# Patient Record
Sex: FEMALE | NOT HISPANIC OR LATINO | Employment: FULL TIME | URBAN - METROPOLITAN AREA
[De-identification: names, ages, dates, MRNs, and addresses within clinical notes are randomized per-mention and may not be internally consistent; named-entity substitution may affect disease eponyms.]

---

## 2022-11-16 ENCOUNTER — HOSPITAL ENCOUNTER (EMERGENCY)
Facility: CLINIC | Age: 37
Discharge: HOME | End: 2022-11-17
Attending: EMERGENCY MEDICINE | Admitting: EMERGENCY MEDICINE
Payer: MEDICAID

## 2022-11-16 VITALS
OXYGEN SATURATION: 98 % | TEMPERATURE: 98.7 F | HEART RATE: 61 BPM | BODY MASS INDEX: 28.09 KG/M2 | SYSTOLIC BLOOD PRESSURE: 144 MMHG | RESPIRATION RATE: 16 BRPM | WEIGHT: 179 LBS | DIASTOLIC BLOOD PRESSURE: 84 MMHG | HEIGHT: 67 IN

## 2022-11-16 DIAGNOSIS — R10.33 PERIUMBILICAL ABDOMINAL PAIN: Primary | ICD-10-CM

## 2022-11-16 LAB
ALBUMIN SERPL-MCNC: 3.8 G/DL (ref 3.4–5)
ALP SERPL-CCNC: 46 U/L (ref 40–150)
ALT SERPL W P-5'-P-CCNC: 22 U/L (ref 0–50)
ANION GAP SERPL CALCULATED.3IONS-SCNC: 3 MMOL/L (ref 3–14)
AST SERPL W P-5'-P-CCNC: 13 U/L (ref 0–45)
BASOPHILS # BLD AUTO: 0 10E3/UL (ref 0–0.2)
BASOPHILS NFR BLD AUTO: 0 %
BILIRUB SERPL-MCNC: 0.5 MG/DL (ref 0.2–1.3)
BUN SERPL-MCNC: 9 MG/DL (ref 7–30)
CALCIUM SERPL-MCNC: 9.1 MG/DL (ref 8.5–10.1)
CHLORIDE BLD-SCNC: 107 MMOL/L (ref 94–109)
CO2 SERPL-SCNC: 28 MMOL/L (ref 20–32)
CREAT SERPL-MCNC: 0.78 MG/DL (ref 0.52–1.04)
EOSINOPHIL # BLD AUTO: 0 10E3/UL (ref 0–0.7)
EOSINOPHIL NFR BLD AUTO: 0 %
ERYTHROCYTE [DISTWIDTH] IN BLOOD BY AUTOMATED COUNT: 13.1 % (ref 10–15)
GFR SERPL CREATININE-BSD FRML MDRD: >90 ML/MIN/1.73M2
GLUCOSE BLD-MCNC: 112 MG/DL (ref 70–99)
HCG SERPL QL: NEGATIVE
HCT VFR BLD AUTO: 39.7 % (ref 35–47)
HGB BLD-MCNC: 12.9 G/DL (ref 11.7–15.7)
IMM GRANULOCYTES # BLD: 0 10E3/UL
IMM GRANULOCYTES NFR BLD: 0 %
LIPASE SERPL-CCNC: 110 U/L (ref 73–393)
LYMPHOCYTES # BLD AUTO: 2.4 10E3/UL (ref 0.8–5.3)
LYMPHOCYTES NFR BLD AUTO: 19 %
MCH RBC QN AUTO: 26.7 PG (ref 26.5–33)
MCHC RBC AUTO-ENTMCNC: 32.5 G/DL (ref 31.5–36.5)
MCV RBC AUTO: 82 FL (ref 78–100)
MONOCYTES # BLD AUTO: 0.9 10E3/UL (ref 0–1.3)
MONOCYTES NFR BLD AUTO: 7 %
NEUTROPHILS # BLD AUTO: 9.3 10E3/UL (ref 1.6–8.3)
NEUTROPHILS NFR BLD AUTO: 74 %
NRBC # BLD AUTO: 0 10E3/UL
NRBC BLD AUTO-RTO: 0 /100
PLATELET # BLD AUTO: 287 10E3/UL (ref 150–450)
POTASSIUM BLD-SCNC: 4 MMOL/L (ref 3.4–5.3)
PROT SERPL-MCNC: 7.3 G/DL (ref 6.8–8.8)
RBC # BLD AUTO: 4.83 10E6/UL (ref 3.8–5.2)
SODIUM SERPL-SCNC: 138 MMOL/L (ref 133–144)
WBC # BLD AUTO: 12.6 10E3/UL (ref 4–11)

## 2022-11-16 PROCEDURE — 85014 HEMATOCRIT: CPT | Performed by: EMERGENCY MEDICINE

## 2022-11-16 PROCEDURE — 85025 COMPLETE CBC W/AUTO DIFF WBC: CPT | Performed by: EMERGENCY MEDICINE

## 2022-11-16 PROCEDURE — 84703 CHORIONIC GONADOTROPIN ASSAY: CPT | Performed by: EMERGENCY MEDICINE

## 2022-11-16 PROCEDURE — 80053 COMPREHEN METABOLIC PANEL: CPT | Performed by: EMERGENCY MEDICINE

## 2022-11-16 PROCEDURE — 83690 ASSAY OF LIPASE: CPT | Performed by: EMERGENCY MEDICINE

## 2022-11-16 PROCEDURE — 99285 EMERGENCY DEPT VISIT HI MDM: CPT | Mod: 25

## 2022-11-16 PROCEDURE — 36415 COLL VENOUS BLD VENIPUNCTURE: CPT | Performed by: EMERGENCY MEDICINE

## 2022-11-16 ASSESSMENT — ENCOUNTER SYMPTOMS
HEMATURIA: 0
PALPITATIONS: 0
HEADACHES: 0
ABDOMINAL PAIN: 1
NECK PAIN: 0
DYSURIA: 0
RHINORRHEA: 0
ABDOMINAL DISTENTION: 0
SHORTNESS OF BREATH: 0
CHEST TIGHTNESS: 0
NAUSEA: 0
DIZZINESS: 0
VOMITING: 0
AGITATION: 0
BACK PAIN: 0
EYE PAIN: 0
COLOR CHANGE: 0
FEVER: 0

## 2022-11-16 NOTE — ED TRIAGE NOTES
Here with complaint of abdominal pain present since yesterday. Pain is pearl-umilical, still has appendix. Was kicked in stomach on Saturday during karate tournament. Pain 6-7/10. Also has noted some heart racing. HR in triage =70. Some nausea, no vomiting.      Triage Assessment     Row Name 11/16/22 1504       Triage Assessment (Adult)    Airway WDL WDL       Respiratory WDL    Respiratory WDL WDL       Skin Circulation/Temperature WDL    Skin Circulation/Temperature WDL WDL       Cardiac WDL    Cardiac WDL WDL       Peripheral/Neurovascular WDL    Peripheral Neurovascular WDL WDL       Cognitive/Neuro/Behavioral WDL    Cognitive/Neuro/Behavioral WDL WDL

## 2022-11-17 ENCOUNTER — APPOINTMENT (OUTPATIENT)
Dept: CT IMAGING | Facility: CLINIC | Age: 37
End: 2022-11-17
Attending: EMERGENCY MEDICINE
Payer: MEDICAID

## 2022-11-17 PROCEDURE — 74177 CT ABD & PELVIS W/CONTRAST: CPT

## 2022-11-17 PROCEDURE — 250N000011 HC RX IP 250 OP 636: Performed by: EMERGENCY MEDICINE

## 2022-11-17 PROCEDURE — 250N000009 HC RX 250: Performed by: EMERGENCY MEDICINE

## 2022-11-17 RX ORDER — IOPAMIDOL 755 MG/ML
90 INJECTION, SOLUTION INTRAVASCULAR ONCE
Status: COMPLETED | OUTPATIENT
Start: 2022-11-17 | End: 2022-11-17

## 2022-11-17 RX ADMIN — IOPAMIDOL 90 ML: 755 INJECTION, SOLUTION INTRAVENOUS at 00:34

## 2022-11-17 RX ADMIN — SODIUM CHLORIDE 66 ML: 9 INJECTION, SOLUTION INTRAVENOUS at 00:34

## 2022-11-17 NOTE — ED NOTES
Patient was assessed and discharged in triage area. Hospital/ED was at capacity and waiting times were long that we were unable to place patient in a ED exam room, patient was discharged from triage area.

## 2022-11-17 NOTE — ED PROVIDER NOTES
"  History   Chief Complaint:  Abdominal Pain       HPI   Rhonda Sharpe is a 37 year old female who presents with epigastric abdominal pain.  Patient states that she received a side kick in her abdomen on Saturday while doing karate which she says is not uncommon to happen.  However, patient states that since yesterday, she began developing epigastric abdominal pain that is worse with standing or movement and is now concerned that this may be secondary to internal bleeding or appendicitis.  Patient denies any hematuria or bloody stools.  No prior abdominal surgeries.  Normal bowel movements.  Not on blood thinners.  Patient rates pain 6 out of 10.    Review of Systems   Constitutional: Negative for fever.   HENT: Negative for congestion and rhinorrhea.    Eyes: Negative for pain and visual disturbance.   Respiratory: Negative for chest tightness and shortness of breath.    Cardiovascular: Negative for chest pain and palpitations.   Gastrointestinal: Positive for abdominal pain. Negative for abdominal distention, nausea and vomiting.   Genitourinary: Negative for dysuria and hematuria.   Musculoskeletal: Negative for back pain and neck pain.   Skin: Negative for color change and pallor.   Neurological: Negative for dizziness and headaches.   Psychiatric/Behavioral: Negative for agitation and behavioral problems.     Allergies:  No Known Allergies    Medications:  None    Past Medical History:     Anxiety  Depression    Past Surgical History:    None    Family History:    No family history on file.    Social History:  Denies alcohol tobacco or drug use.  PCP: No Ref-Primary, Physician       Physical Exam     Patient Vitals for the past 24 hrs:   BP Temp Temp src Pulse Resp SpO2 Height Weight   11/16/22 1648 (!) 144/84 98.7  F (37.1  C) Temporal 61 16 98 % 1.689 m (5' 6.5\") 81.2 kg (179 lb)       Physical Exam  Constitutional:       General: Not in acute distress.        Appearance: Normal appearance.   HENT:      Head: " Normocephalic and atraumatic.   Eyes:      Extraocular Movements: Extraocular movements intact.      Conjunctiva/sclera: Conjunctivae normal.   Cardiovascular:      Rate and Rhythm: Normal rate and regular rhythm.   Pulmonary:      Effort: Pulmonary effort is normal. No respiratory distress.      Breath sounds: Normal breath sounds.   Abdominal:      General: Abdomen is flat. There is no distension.      Palpations: Abdomen is soft.      Tenderness: There is epigastric abdominal tenderness.   Musculoskeletal:      Cervical back: Normal range of motion. No rigidity.      Right lower leg: No edema.      Left lower leg: No edema.   Skin:     General: Skin is warm and dry.   Neurological:      General: No focal deficit present.      Mental Status: Alert and oriented to person, place, and time.   Psychiatric:         Mood and Affect: Mood normal.         Behavior: Behavior normal.    Emergency Department Course   ECG  No results found for this or any previous visit.    Imaging:  CT Abdomen Pelvis w Contrast   Final Result   IMPRESSION:    1.  No acute findings in the abdomen or pelvis.        Report per radiology    Laboratory:  Labs Ordered and Resulted from Time of ED Arrival to Time of ED Departure   COMPREHENSIVE METABOLIC PANEL - Abnormal       Result Value    Sodium 138      Potassium 4.0      Chloride 107      Carbon Dioxide (CO2) 28      Anion Gap 3      Urea Nitrogen 9      Creatinine 0.78      Calcium 9.1      Glucose 112 (*)     Alkaline Phosphatase 46      AST 13      ALT 22      Protein Total 7.3      Albumin 3.8      Bilirubin Total 0.5      GFR Estimate >90     CBC WITH PLATELETS AND DIFFERENTIAL - Abnormal    WBC Count 12.6 (*)     RBC Count 4.83      Hemoglobin 12.9      Hematocrit 39.7      MCV 82      MCH 26.7      MCHC 32.5      RDW 13.1      Platelet Count 287      % Neutrophils 74      % Lymphocytes 19      % Monocytes 7      % Eosinophils 0      % Basophils 0      % Immature Granulocytes 0       NRBCs per 100 WBC 0      Absolute Neutrophils 9.3 (*)     Absolute Lymphocytes 2.4      Absolute Monocytes 0.9      Absolute Eosinophils 0.0      Absolute Basophils 0.0      Absolute Immature Granulocytes 0.0      Absolute NRBCs 0.0     LIPASE - Normal    Lipase 110     HCG QUALITATIVE PREGNANCY - Normal    hCG Serum Qualitative Negative        Emergency Department Course:       Reviewed:  I reviewed nursing notes, vitals, past medical history and Care Everywhere    Assessments/Consults:  ED Course as of 22 0118   Thu 2022   0057 CT Abdomen Pelvis w Contrast  No acute findings in the abdomen or pelvis.   0101 Patient updated on lab and image findings.  Resting comfortably in chair asleep.  Discussed skin thickening over periumbilical area which patient states that she has history of scarring from umbilical piercing.  Patient feels comfortable going home at this time. Discussed return precautions.  Answered all questions.  Patient voiced understanding and agreement with plan.     Disposition:  The patient was discharged to home.     Impression & Plan     CMS Diagnoses: None    Medical Decision Makin-year-old female as described above presents to the emergency department with epigastric abdominal pain after recently kicked in the stomach area on Saturday.  Patient hemodynamically stable at time evaluation.  Afebrile.  Abdominal pain work-up ordered from triage negative for abnormalities beyond mild leukocytosis.  We will add on CT abdomen pelvis with contrast for evaluation for acute intra-abdominal pathologies.  Differential diagnoses considered include, but not limited to, traumatic intra-abdominal injuries, hemorrhage, appendicitis, pancreatitis, and colitis.  Discussed care plan with patient who voiced understanding and agreement with plan.  Answered all questions.  Additional work-up and orders as listed in chart.    Please refer to ED course above for details on the patient's treatment course  and any changes or updates in care plan beyond my initial evaluation and MDM.    Diagnosis:    ICD-10-CM    1. Periumbilical abdominal pain  R10.33           Discharge Medications:  There are no discharge medications for this patient.         Moe Jarvis DO  11/17/22 0118

## 2024-01-18 ENCOUNTER — HOSPITAL ENCOUNTER (EMERGENCY)
Facility: CLINIC | Age: 39
Discharge: HOME | End: 2024-01-18
Attending: EMERGENCY MEDICINE | Admitting: EMERGENCY MEDICINE
Payer: COMMERCIAL

## 2024-01-18 ENCOUNTER — OFFICE VISIT (OUTPATIENT)
Dept: URGENT CARE | Facility: URGENT CARE | Age: 39
End: 2024-01-18
Payer: COMMERCIAL

## 2024-01-18 ENCOUNTER — APPOINTMENT (OUTPATIENT)
Dept: ULTRASOUND IMAGING | Facility: CLINIC | Age: 39
End: 2024-01-18
Attending: EMERGENCY MEDICINE
Payer: COMMERCIAL

## 2024-01-18 VITALS
OXYGEN SATURATION: 100 % | DIASTOLIC BLOOD PRESSURE: 82 MMHG | HEART RATE: 65 BPM | TEMPERATURE: 98.2 F | RESPIRATION RATE: 16 BRPM | SYSTOLIC BLOOD PRESSURE: 128 MMHG

## 2024-01-18 VITALS
TEMPERATURE: 98.8 F | BODY MASS INDEX: 31.8 KG/M2 | DIASTOLIC BLOOD PRESSURE: 83 MMHG | HEART RATE: 76 BPM | OXYGEN SATURATION: 99 % | WEIGHT: 200 LBS | SYSTOLIC BLOOD PRESSURE: 132 MMHG

## 2024-01-18 DIAGNOSIS — R11.0 NAUSEA: ICD-10-CM

## 2024-01-18 DIAGNOSIS — R10.9 RIGHT SIDED ABDOMINAL PAIN: ICD-10-CM

## 2024-01-18 DIAGNOSIS — R10.11 RUQ ABDOMINAL PAIN: Primary | ICD-10-CM

## 2024-01-18 DIAGNOSIS — K21.9 GASTROESOPHAGEAL REFLUX DISEASE WITHOUT ESOPHAGITIS: ICD-10-CM

## 2024-01-18 DIAGNOSIS — M54.6 ACUTE LEFT-SIDED THORACIC BACK PAIN: ICD-10-CM

## 2024-01-18 LAB
ALBUMIN SERPL BCG-MCNC: 4.3 G/DL (ref 3.5–5.2)
ALBUMIN UR-MCNC: NEGATIVE MG/DL
ALP SERPL-CCNC: 51 U/L (ref 40–150)
ALT SERPL W P-5'-P-CCNC: 16 U/L (ref 0–50)
ANION GAP SERPL CALCULATED.3IONS-SCNC: 9 MMOL/L (ref 7–15)
APPEARANCE UR: CLEAR
AST SERPL W P-5'-P-CCNC: 34 U/L (ref 0–45)
BASOPHILS # BLD AUTO: 0 10E3/UL (ref 0–0.2)
BASOPHILS NFR BLD AUTO: 0 %
BILIRUB SERPL-MCNC: 0.2 MG/DL
BILIRUB UR QL STRIP: NEGATIVE
BUN SERPL-MCNC: 10.6 MG/DL (ref 6–20)
CALCIUM SERPL-MCNC: 10 MG/DL (ref 8.6–10)
CHLORIDE SERPL-SCNC: 101 MMOL/L (ref 98–107)
COLOR UR AUTO: ABNORMAL
CREAT SERPL-MCNC: 0.71 MG/DL (ref 0.51–0.95)
DEPRECATED HCO3 PLAS-SCNC: 26 MMOL/L (ref 22–29)
EGFRCR SERPLBLD CKD-EPI 2021: >90 ML/MIN/1.73M2
EOSINOPHIL # BLD AUTO: 0 10E3/UL (ref 0–0.7)
EOSINOPHIL NFR BLD AUTO: 0 %
ERYTHROCYTE [DISTWIDTH] IN BLOOD BY AUTOMATED COUNT: 12.3 % (ref 10–15)
GLUCOSE SERPL-MCNC: 99 MG/DL (ref 70–99)
GLUCOSE UR STRIP-MCNC: NEGATIVE MG/DL
HCG UR QL: NEGATIVE
HCT VFR BLD AUTO: 38.6 % (ref 35–47)
HGB BLD-MCNC: 12.6 G/DL (ref 11.7–15.7)
HGB UR QL STRIP: NEGATIVE
IMM GRANULOCYTES # BLD: 0 10E3/UL
IMM GRANULOCYTES NFR BLD: 0 %
KETONES UR STRIP-MCNC: NEGATIVE MG/DL
LEUKOCYTE ESTERASE UR QL STRIP: NEGATIVE
LIPASE SERPL-CCNC: 34 U/L (ref 13–60)
LYMPHOCYTES # BLD AUTO: 1.9 10E3/UL (ref 0.8–5.3)
LYMPHOCYTES NFR BLD AUTO: 26 %
MCH RBC QN AUTO: 26.3 PG (ref 26.5–33)
MCHC RBC AUTO-ENTMCNC: 32.6 G/DL (ref 31.5–36.5)
MCV RBC AUTO: 81 FL (ref 78–100)
MONOCYTES # BLD AUTO: 0.5 10E3/UL (ref 0–1.3)
MONOCYTES NFR BLD AUTO: 7 %
MUCOUS THREADS #/AREA URNS LPF: PRESENT /LPF
NEUTROPHILS # BLD AUTO: 4.7 10E3/UL (ref 1.6–8.3)
NEUTROPHILS NFR BLD AUTO: 67 %
NITRATE UR QL: NEGATIVE
NRBC # BLD AUTO: 0 10E3/UL
NRBC BLD AUTO-RTO: 0 /100
PH UR STRIP: 5.5 [PH] (ref 5–7)
PLATELET # BLD AUTO: 293 10E3/UL (ref 150–450)
POTASSIUM SERPL-SCNC: 5 MMOL/L (ref 3.4–5.3)
PROT SERPL-MCNC: 7.3 G/DL (ref 6.4–8.3)
RBC # BLD AUTO: 4.79 10E6/UL (ref 3.8–5.2)
RBC URINE: 1 /HPF
SODIUM SERPL-SCNC: 136 MMOL/L (ref 135–145)
SP GR UR STRIP: 1.01 (ref 1–1.03)
SQUAMOUS EPITHELIAL: 2 /HPF
UROBILINOGEN UR STRIP-MCNC: NORMAL MG/DL
WBC # BLD AUTO: 7.1 10E3/UL (ref 4–11)
WBC URINE: 0 /HPF

## 2024-01-18 PROCEDURE — 85025 COMPLETE CBC W/AUTO DIFF WBC: CPT | Performed by: EMERGENCY MEDICINE

## 2024-01-18 PROCEDURE — 99204 OFFICE O/P NEW MOD 45 MIN: CPT

## 2024-01-18 PROCEDURE — 81001 URINALYSIS AUTO W/SCOPE: CPT | Performed by: EMERGENCY MEDICINE

## 2024-01-18 PROCEDURE — 76705 ECHO EXAM OF ABDOMEN: CPT

## 2024-01-18 PROCEDURE — 99284 EMERGENCY DEPT VISIT MOD MDM: CPT | Mod: 25

## 2024-01-18 PROCEDURE — 81025 URINE PREGNANCY TEST: CPT | Performed by: EMERGENCY MEDICINE

## 2024-01-18 PROCEDURE — 83690 ASSAY OF LIPASE: CPT | Performed by: EMERGENCY MEDICINE

## 2024-01-18 PROCEDURE — 36415 COLL VENOUS BLD VENIPUNCTURE: CPT | Performed by: EMERGENCY MEDICINE

## 2024-01-18 PROCEDURE — 80053 COMPREHEN METABOLIC PANEL: CPT | Performed by: EMERGENCY MEDICINE

## 2024-01-18 ASSESSMENT — ACTIVITIES OF DAILY LIVING (ADL): ADLS_ACUITY_SCORE: 35

## 2024-01-18 NOTE — PROGRESS NOTES
ASSESSMENT:  (R10.11) RUQ abdominal pain  (primary encounter diagnosis)    (R11.0) Nausea    PLAN:  Patient declined Zofran in the clinic.  The patient and I discussed that given her current symptoms, severity of symptoms, the duration of her symptoms and the clinical exam today; it is advised that she proceed to an emergency room for an ultrasound or CT scan.  We discussed the [facility] urgency room and this address was provided for her in the after visit summary.  Patient acknowledged her understanding of the above plan and indicated she would proceed to an urgency room for further evaluation and treatment.    The use of [email or web address] services may have been used to construct the content in this note; any grammatical or spelling errors are non-intentional. Please contact the author of this note directly if you are in need of any clarification.      [facility] [place]    SUBJECTIVE:   Rhonda Sharpe is a 38 year old female with symptoms of abdominal pain RUQ and RLQ, chills, and nausea which began 5 days ago.    Symptoms are gradual onset and intermittent and moderate.    Aggravating factors: nothing.    Alleviating factors:antacids  Associated symptoms:  Described as: acid and gas   Fever: chills  Stools: normal per patient with the last BM two hours ago  Appetite: decreased  Risk factors: none    ROS:  Negative except noted above.     OBJECTIVE:   /83   Pulse 76   Temp 98.8  F (37.1  C) (Tympanic)   Wt 90.7 kg (200 lb)   LMP 01/13/2024   SpO2 99%   BMI 31.80 kg/m    GENERAL APPEARANCE: healthy, alert and no distress  EYES: EOMI,  PERRL, conjunctiva clear  HENT: nose and mouth without erythema, ulcers or lesions and oral mucous membranes moist, no erythema noted  RESP: lungs clear to auscultation - no rales, rhonchi or wheezes  CV: regular rates and rhythm, normal S1 S2, no murmur noted  ABDOMEN:  soft, nontender, no HSM or masses and bowel sounds normal  SKIN: no  suspicious lesions or rashes

## 2024-01-18 NOTE — ED PROVIDER NOTES
Rapid Assessment Note    History:   Rhonda Sharpe is a 38 year old female who presents with abdominal pain.  She states that she has had this pain intermittently for the past several days.  It initially was around her bellybutton region but has since mainly been in the right upper quadrant region.  On the drive here from urgent care, she did develop a little bit of pain in the right lower quadrant region.  She does not really have much pain right now.  She has had some nausea with it but no vomiting.  She has had some heartburn as well but eating does not seem to make much of an impact on her pain.  There is no other radiation of the pain.  The pain feels sharp and crampy at times.  Her menstrual cycle came a few days early and ended today.  She has no current vaginal bleeding.  She denies any fevers, urinary symptoms, or diarrhea.  She also has not had any blood in her stools or melena.  Additionally, she has had some left posterior shoulder pain for the past week or so with some numbness in her left fingers.  She has been taking a little bit of ibuprofen for that but not excessive.  No history of abdominal surgeries.  She denies any chest pain or shortness of breath.  She first went to urgent care and was referred here.    Exam:   General:  Alert, interactive  Cardiovascular:  Well perfused  Lungs:  No respiratory distress, no accessory muscle use  Abdomen: Some tenderness to palpation in the right upper quadrant region, but no tenderness elsewhere  Neuro:  Moving all 4 extremities  Skin:  Warm, dry  Psych:  Normal affect      Plan of Care:   I evaluated the patient and developed an initial plan of care. I discussed this plan and explained that I, or one of my partners, would be returning to complete the evaluation.  Blood work and right upper quadrant ultrasound of the abdomen ordered.          Loy Gallagher MD      1/18/2024  [facility]    Portions of this medical record  were completed by a scribe. UPON MY REVIEW AND AUTHENTICATION BY ELECTRONIC SIGNATURE, this confirms (a) I performed the applicable clinical services, and (b) the record is accurate.        Loy Gallagher MD  01/18/24 0487

## 2024-01-18 NOTE — PATIENT INSTRUCTIONS
Given your current symptoms, the severity of your symptoms, the duration of your symptoms and the clinical exam today; it is advised that you proceed to an urgency room for an ultrasound or CT scan.      [facility]  [address], [place] [address]

## 2024-01-18 NOTE — ED TRIAGE NOTES
Pt presents to ed to be evaluated for abdominal pain.   Pt states that she has had abdominal pain for the past week, and also been experiencing some heartburn. Pt states that now, she is having abdominal pain in the right upper quadrant.     Triage Assessment (Adult)       Row Name 01/18/24 1716          Triage Assessment    Airway WDL WDL        Respiratory WDL    Respiratory WDL WDL        Cognitive/Neuro/Behavioral WDL    Cognitive/Neuro/Behavioral WDL WDL

## 2024-01-18 NOTE — ED NOTES
Seen in UC for RLQ abdominal pain that radiates to right shoulder. Concerned for gallstones. Asked to go to ED for ultrasound as they do not have one at the clinic.

## 2024-01-19 NOTE — DISCHARGE INSTRUCTIONS
Daily omeprazole (over the counter).  No alcohol or NSAIDS. Decrease vaping. [place] diet.  For your back pain I would use Tylenol and over-the-counter lidocaine patches.  Return immediately with worsening or changing pain or development of new symptoms including, but not limited to, fever, shortness of breath, or black stools.

## 2024-01-19 NOTE — ED PROVIDER NOTES
History     Chief Complaint:  Abdominal Pain     The history is provided by the patient.      Rhonda Sharpe is a 38 year old female who vapes and presents alone for evaluation of abdominal pain.  She has had right-sided mid abdominal pain intermittently for a couple of days.  She became concerned because she has also had heartburn for the last couple of weeks although she does recall she has been taking ibuprofen more in the last week or so as she has developed a left thoracic back pain associated with the tingling in her left ring finger and pinky.  She has been using Tums for her heartburn.  She describes her abdominal pain as both sharp and dull, intermittent.  It often occurs for only a couple of minutes and then resolves.  She describes it as moderate in intensity.  She denies associated diarrhea, constipation, black or bloody stools, vomiting, fever, or changes to urination.  She uses alcohol infrequently.    Independent Historian:   As above.    Review of External Notes:   Reviewed notes from urgent care visit on 1/18/2024.    Medications:    The patient is currently on no regular medications.    Past Medical History:    Tobacco dependence  Lumbar radiculopathy  Anxiety  Depression  Seasonal allergies    Physical Exam   Patient Vitals for the past 24 hrs:   BP Temp Temp src Pulse Resp SpO2   01/18/24 1956 128/82 -- -- 65 16 100 %   01/18/24 1754 (!) 147/90 -- -- 59 18 99 %   01/18/24 1717 (!) 145/75 -- -- -- 21 --   01/18/24 1715 -- 98.2  F (36.8  C) Temporal 67 -- 100 %     Physical Exam  General: Well-developed and well-nourished. Well appearing young -American woman. Cooperative.  Head:  Atraumatic.  Eyes:  Conjunctivae, lids, and sclerae are normal.  Neck:  Supple. Normal range of motion.  CV:  Regular rate and rhythm. Normal heart sounds with no murmurs, rubs, or gallops detected.  Resp:  No respiratory distress. Clear to auscultation bilaterally without decreased breath sounds, wheezing, rales,  or rhonchi.  GI:  Soft. Non-distended. Non-tender.    MS:  Normal ROM.   Skin:  Warm. Non-diaphoretic. No pallor.  Neuro:  Awake. A&Ox3. Normal strength.  Psych: Normal mood and affect. Normal speech.  Vitals reviewed.    Emergency Department Course   Imaging:  US Abdomen Limited (RUQ)   Final Result   IMPRESSION:   1.  Normal limited abdominal ultrasound.            Report per radiology.    Laboratory:  Labs Ordered and Resulted from Time of ED Arrival to Time of ED Departure   ROUTINE UA WITH MICROSCOPIC REFLEX TO CULTURE - Abnormal       Result Value    Color Urine Straw      Appearance Urine Clear      Glucose Urine Negative      Bilirubin Urine Negative      Ketones Urine Negative      Specific Gravity Urine 1.010      Blood Urine Negative      pH Urine 5.5      Protein Albumin Urine Negative      Urobilinogen Urine Normal      Nitrite Urine Negative      Leukocyte Esterase Urine Negative      Mucus Urine Present (*)     RBC Urine 1      WBC Urine 0      Squamous Epithelials Urine 2 (*)    CBC WITH PLATELETS AND DIFFERENTIAL - Abnormal    WBC Count 7.1      RBC Count 4.79      Hemoglobin 12.6      Hematocrit 38.6      MCV 81      MCH 26.3 (*)     MCHC 32.6      RDW 12.3      Platelet Count 293      % Neutrophils 67      % Lymphocytes 26      % Monocytes 7      % Eosinophils 0      % Basophils 0      % Immature Granulocytes 0      NRBCs per 100 WBC 0      Absolute Neutrophils 4.7      Absolute Lymphocytes 1.9      Absolute Monocytes 0.5      Absolute Eosinophils 0.0      Absolute Basophils 0.0      Absolute Immature Granulocytes 0.0      Absolute NRBCs 0.0     COMPREHENSIVE METABOLIC PANEL - Normal    Sodium 136      Potassium 5.0      Carbon Dioxide (CO2) 26      Anion Gap 9      Urea Nitrogen 10.6      Creatinine 0.71      GFR Estimate >90      Calcium 10.0      Chloride 101      Glucose 99      Alkaline Phosphatase 51      AST 34      ALT 16      Protein Total 7.3      Albumin 4.3      Bilirubin Total 0.2      LIPASE - Normal    Lipase 34     HCG QUALITATIVE URINE - Normal    hCG Urine Qualitative Negative       Emergency Department Course & Assessments:  Independent Interpretation (X-rays, CTs, rhythm strip):  None    Assessments/Consultations/Discussion of Management or Tests:   ED Course as of 01/18/24 2001   u Jan 18, 2024 1915 I obtained history and examined the patient as noted above.       Social Determinants of Health affecting care:   Has a PCP, but hasn't been to them in a while.     Disposition:  The patient was discharged.     Impression & Plan    Medical Decision Making:  Rhonda is a 38 year old woman with intermittent right-sided abdominal pain for the last couple of days and heartburn.  She admits she has been taking ibuprofen more for a right thoracic back pain that causes tingling in her left ring finger and pinky.  She appears well on exam.  She does not have significant abdominal tenderness.    It sounds like she is having a radiculopathy that is causing her to use more ibuprofen and this is likely increasing her GERD.  I suspect this is the sole cause of her pain but she describes it as more right-sided so we did send her for right upper quadrant ultrasound.  Fortunately, this is without acute pathology.  Laboratory studies were obtained revealing no UTI, leukocytosis, anemia, kidney injury, electrolyte derangement, hepatitis, biliary obstruction, pancreatitis, or pregnancy. Her presentation is not consistent with appendicitis with no tenderness on exam and CT is not indicated.    I have recommended she stop using the ibuprofen and use Tylenol and over-the-counter lidocaine patches for her radiculopathy instead.  We discussed other lifestyle modifications including decreasing vaping and I will have her start daily omeprazole.  She should follow-up with her primary care provider but indications to return to the emergency department were discussed.  All questions answered.  Amenable to  discharge.    Diagnosis:    ICD-10-CM    1. Right sided abdominal pain  R10.9       2. Gastroesophageal reflux disease without esophagitis  K21.9       3. Acute left-sided thoracic back pain  M54.6         Scribe Disclosure:  I, Rocio Noreen, am serving as a scribe at 7:15 PM on 1/18/2024 to document services personally performed by Nicki Sweeney MD based on my observations and the provider's statements to me.     1/18/2024   Nicki Sweeney MD Dixson, Kylie S, MD  01/26/24 1503